# Patient Record
Sex: MALE | Race: WHITE | URBAN - METROPOLITAN AREA
[De-identification: names, ages, dates, MRNs, and addresses within clinical notes are randomized per-mention and may not be internally consistent; named-entity substitution may affect disease eponyms.]

---

## 2017-02-25 ENCOUNTER — HOSPITAL ENCOUNTER (EMERGENCY)
Facility: CLINIC | Age: 22
Discharge: HOME | End: 2017-02-25
Attending: EMERGENCY MEDICINE | Admitting: EMERGENCY MEDICINE
Payer: COMMERCIAL

## 2017-02-25 VITALS
HEART RATE: 52 BPM | RESPIRATION RATE: 16 BRPM | SYSTOLIC BLOOD PRESSURE: 99 MMHG | TEMPERATURE: 97.3 F | OXYGEN SATURATION: 100 % | DIASTOLIC BLOOD PRESSURE: 52 MMHG

## 2017-02-25 DIAGNOSIS — F32.A DEPRESSION, UNSPECIFIED DEPRESSION TYPE: ICD-10-CM

## 2017-02-25 DIAGNOSIS — F41.9 ANXIETY: ICD-10-CM

## 2017-02-25 PROCEDURE — 99283 EMERGENCY DEPT VISIT LOW MDM: CPT | Mod: Z6 | Performed by: EMERGENCY MEDICINE

## 2017-02-25 PROCEDURE — 99285 EMERGENCY DEPT VISIT HI MDM: CPT | Mod: 25 | Performed by: EMERGENCY MEDICINE

## 2017-02-25 PROCEDURE — 90791 PSYCH DIAGNOSTIC EVALUATION: CPT

## 2017-02-25 ASSESSMENT — ENCOUNTER SYMPTOMS
SHORTNESS OF BREATH: 0
FEVER: 0
HEADACHES: 0
AGITATION: 1
DYSPHORIC MOOD: 0
HALLUCINATIONS: 0
ABDOMINAL PAIN: 0

## 2017-02-25 NOTE — DISCHARGE INSTRUCTIONS
Understanding Anxiety Disorders  Almost everyone gets nervous now and then. It s normal to have knots in your stomach before a test, or for your heart to race on a first date. But an anxiety disorder is much more than a case of nerves. In fact, its symptoms may be overwhelming. But treatment can relieve many of these symptoms. Talking to your doctor is the first step.    What are anxiety disorders?  An anxiety disorder causes intense feelings of panic and fear. These feelings may arise for no apparent reason. And they tend to recur again and again. They may prevent you from coping with life and cause you great distress. As a result, you may avoid anything that triggers your fear. In extreme cases, you may never leave the house. Anxiety disorders may cause other symptoms, such as:    Obsessive thoughts you can t control    Constant nightmares or painful thoughts of the past    Nausea, sweating, and muscle tension    Difficulty sleeping or concentrating  What causes anxiety disorders?  Anxiety disorders tend to run in families. For some people, childhood abuse or neglect may play a role. For others, stressful life events or trauma may trigger anxiety disorders. Anxiety can trigger low self-esteem and poor coping skills.  Common anxiety disorders    Panic disorder: This causes an intense fear of being in danger.    Phobias: These are extreme fears of certain objects, places, or events.    Obsessive-compulsive disorder: This causes you to have unwanted thoughts. You also may perform certain actions over and over.    Posttraumatic stress disorder: This occurs in people who have survived a terrible ordeal. It can cause nightmares and flashbacks about the event.    Generalized anxiety disorder: This causes constant worry that can greatly disrupt your life.   Getting better  You may believe that nothing can help you. Or, you might fear what others may think. But most anxiety symptoms can be eased. Having an anxiety  disorder is nothing to be ashamed of. Most people do best with treatment that combines medication and therapy. Although these aren t cures, they can help you live a healthier life.    1877-5460 [employer]. [address] [place] [address], [place] [address]. All rights reserved. This information is not intended as a substitute for professional medical care. Always follow your healthcare professional's instructions.      Please make an appointment to follow up with the therapy appointment provided.

## 2017-02-25 NOTE — ED PROVIDER NOTES
History     Chief Complaint   Patient presents with     Agitation     Patient was at a [employer] with friend and was trying to obtain something at the food shelf and became agitated     HPI  Chandler Baker is a 22 year old male with a reported history of anxiety and depression who presents to the emergency department today after episode of agitation.  He states that he has had very poor sleep over the last week because he has over-scheduled himself, and is biting off more than he can chew. He states that he was driving to a friend s house today, started to feel overwhelmed, agitated, and decided to pull over to the side of the street. He states that he took a water bottle from his car and started hitting himself in the head, stating that he did this solely out of frustration rather than out of any thought or intent to harm himself.  He states that apparently someone outside of his car saw him doing this and called the police, who brought him here.  He states adamantly he is not suicidal or homicidal, he has no plans to harm himself.  He denies any auditory or visual hallucinations.  He denies any drug or alcohol use. He denies any history of hospitalizations but does state that he had seen a psychiatrist in the past for anxiety and depression.  He is not currently recommended to be on any medication.    This part of the document was transcribed by Arik Martin Medical Scribe.     I have reviewed the Medications, Allergies, Past Medical and Surgical History, and Social History in the [employer] system.    PAST MEDICAL HISTORY: History reviewed. No pertinent past medical history.    PAST SURGICAL HISTORY: History reviewed. No pertinent past surgical history.    FAMILY HISTORY: No family history on file.    SOCIAL HISTORY:   Social History   Substance Use Topics     Smoking status: Never Smoker     Smokeless tobacco: Not on file     Alcohol use No     There are no discharge medications for this patient.     No Known  Allergies     Review of Systems   Constitutional: Negative for fever.   Respiratory: Negative for shortness of breath.    Cardiovascular: Negative for chest pain.   Gastrointestinal: Negative for abdominal pain.   Neurological: Negative for headaches.   Psychiatric/Behavioral: Positive for agitation. Negative for dysphoric mood, hallucinations, self-injury and suicidal ideas.   All other systems reviewed and are negative.      Physical Exam   BP: 98/52  Pulse: 52  Temp: 97.3  F (36.3  C)  Resp: 16  SpO2: 100 %  Physical Exam   Constitutional: No distress.   HENT:   Head: Atraumatic.   Mouth/Throat: No oropharyngeal exudate.   Eyes: No scleral icterus.   Cardiovascular: Normal heart sounds and intact distal pulses.    Pulmonary/Chest: Breath sounds normal. No respiratory distress.   Abdominal: Soft. There is no tenderness.   Musculoskeletal: He exhibits no edema or tenderness.   Skin: Skin is warm. No rash noted. He is not diaphoretic.       ED Course     ED Course     Procedures             Critical Care time:  none               Labs Ordered and Resulted from Time of ED Arrival Up to the Time of Departure from the ED - No data to display    Assessments & Plan (with Medical Decision Making)   The patient has no injuries noted on exam. He denies headache, has a normal neurologic exam. He is adamant that he was acting out because he was frustrated, not because he desires to harm himself.  He is adamant that he is not suicidal.  He has no physical complaints this time.  I don t think that he is holdable, and he does not appear to desire admission.  He is interested in following up in therapy, and the [facility]  was able to get him a therapy appointment.  I do think this is in his best interest.  He is instructed to return the ER with any worsening or concerns.     This part of the document was transcribed by Araceli Kenny Scribe.     I have reviewed the nursing notes.    I have reviewed the findings,  diagnosis, plan and need for follow up with the patient.    There are no discharge medications for this patient.      Final diagnoses:   Anxiety   Depression, unspecified depression type     I, Arik Martin, am serving as a trained medical scribe to document services personally performed by Shannan Love MD, based on the provider's statements to me.   I, Shannan Love MD, was physically present and have reviewed and verified the accuracy of this note documented by Arik Martin.  2/25/2017   [facility], [place], EMERGENCY DEPARTMENT     Shannan Love MD  02/25/17 [id number]

## 2017-02-25 NOTE — ED AVS SNAPSHOT
[facility], Emergency Department    [address]    [place] [address]    Phone:  [phone]    Fax:  [phone]                                       Chandler Baker   MRN: [id number]    Department:  [facility], Emergency Department   Date of Visit:  2/25/2017           Patient Information     Date Of Birth          1995        Your diagnoses for this visit were:     Anxiety     Depression, unspecified depression type        You were seen by Shannan Love MD.        Discharge Instructions         Understanding Anxiety Disorders  Almost everyone gets nervous now and then. It s normal to have knots in your stomach before a test, or for your heart to race on a first date. But an anxiety disorder is much more than a case of nerves. In fact, its symptoms may be overwhelming. But treatment can relieve many of these symptoms. Talking to your doctor is the first step.    What are anxiety disorders?  An anxiety disorder causes intense feelings of panic and fear. These feelings may arise for no apparent reason. And they tend to recur again and again. They may prevent you from coping with life and cause you great distress. As a result, you may avoid anything that triggers your fear. In extreme cases, you may never leave the house. Anxiety disorders may cause other symptoms, such as:    Obsessive thoughts you can t control    Constant nightmares or painful thoughts of the past    Nausea, sweating, and muscle tension    Difficulty sleeping or concentrating  What causes anxiety disorders?  Anxiety disorders tend to run in families. For some people, childhood abuse or neglect may play a role. For others, stressful life events or trauma may trigger anxiety disorders. Anxiety can trigger low self-esteem and poor coping skills.  Common anxiety disorders    Panic disorder: This causes an intense fear of being in danger.    Phobias: These are extreme fears of certain objects, places, or  events.    Obsessive-compulsive disorder: This causes you to have unwanted thoughts. You also may perform certain actions over and over.    Posttraumatic stress disorder: This occurs in people who have survived a terrible ordeal. It can cause nightmares and flashbacks about the event.    Generalized anxiety disorder: This causes constant worry that can greatly disrupt your life.   Getting better  You may believe that nothing can help you. Or, you might fear what others may think. But most anxiety symptoms can be eased. Having an anxiety disorder is nothing to be ashamed of. Most people do best with treatment that combines medication and therapy. Although these aren t cures, they can help you live a healthier life.    9463-8794 [employer]. [address] [place] [address], [place] [address]. All rights reserved. This information is not intended as a substitute for professional medical care. Always follow your healthcare professional's instructions.      Please make an appointment to follow up with the therapy appointment provided.      24 Hour Appointment Hotline       To make an appointment at any [facility], call [phone] ([phone]). If you don't have a family doctor or clinic, we will help you find one. [facility] clinics are conveniently located to serve the needs of you and your family.             Review of your medicines      Notice     You have not been prescribed any medications.            Orders Needing Specimen Collection     None      Pending Results     No orders found from 2/23/2017 to 2/26/2017.            Pending Culture Results     No orders found from 2/23/2017 to 2/26/2017.            Thank you for choosing [facility]       Thank you for choosing [facility] for your care. Our goal is always to provide you with excellent care. Hearing back from our patients is one way we can continue to improve our services. Please take a few minutes to complete the written survey that you may  "receive in the mail after you visit with us. Thank you!        [employer] Information     [employer] lets you send messages to your doctor, view your test results, renew your prescriptions, schedule appointments and more. To sign up, go to [email or web address] . Click on \"Log in\" on the left side of the screen, which will take you to the Welcome page. Then click on \"Sign up Now\" on the right side of the page.     You will be asked to enter the access code listed below, as well as some personal information. Please follow the directions to create your username and password.     Your access code is: [email or web address]  Expires: 2017 11:38 AM     Your access code will  in 90 days. If you need help or a new code, please call your [place] clinic or [phone].        Care EveryWhere ID     This is your Care EveryWhere ID. This could be used by other organizations to access your [place] medical records  [id number]        After Visit Summary       This is your record. Keep this with you and show to your community pharmacist(s) and doctor(s) at your next visit.                  "

## 2017-02-25 NOTE — ED AVS SNAPSHOT
[facility], Emergency Department    [address]    [facility] [address]    Phone:  [phone]    Fax:  [phone]                                       Chandler Baker   MRN: [id number]    Department:  [facility], Emergency Department   Date of Visit:  2/25/2017           After Visit Summary Signature Page     I have received my discharge instructions, and my questions have been answered. I have discussed any challenges I see with this plan with the nurse or doctor.    ..........................................................................................................................................  Patient/Patient Representative Signature      ..........................................................................................................................................  Patient Representative Print Name and Relationship to Patient    ..................................................               ................................................  Date                                            Time    ..........................................................................................................................................  Reviewed by Signature/Title    ...................................................              ..............................................  Date                                                            Time

## 2017-02-25 NOTE — ED NOTES
Bed: ED12  Expected date: 2/25/17  Expected time: 9:55 AM  Means of arrival: Ambulance  Comments:  [address]  22y M Anxiety, depression

## 2017-02-25 NOTE — ED NOTES
Patient reports to this nurse that he became agitated when a person he knows asked if he was narcissistic; pt was at a food shelf when displaying agitation and then PD was called. Pt transported voluntarily by EMS. Pt has difficulty tracking thoughts, has a vocal tic when speaking, goes off on tangents and rambles at times